# Patient Record
(demographics unavailable — no encounter records)

---

## 2025-01-27 NOTE — HISTORY OF PRESENT ILLNESS
[FreeTextEntry6] : croup like cough since last night no fevers has runny nose no difficulty breathing

## 2025-01-27 NOTE — DISCUSSION/SUMMARY
[FreeTextEntry1] : viral crop parent would like viral testing dexamethasone given in office 12 mg supportive care and warning signs discussed

## 2025-03-24 NOTE — DISCUSSION/SUMMARY
[FreeTextEntry1] :  In no acute distress. Appears adequately hydrated. Rapid strep negative, culture pending.   Recommend using mist from a humidifier. Allow the child to breathe cool air during the night by opening a window or door. Fever can be treated with an over-the-counter medication such as acetaminophen or ibuprofen. Coughing can be treated with warm, clear fluids to loosen mucus on the vocal cords. Warm water, apple juice, or lemonade is safe for children older than four months. Frozen juice popsicles also can be given. Keep the child's head elevated. If the child's stridor does not improve contact health care provider immediately.  Discussed indication for use of oral steroid.    RED FLAGS REVIEWED- discussed s/s of respiratory distress/ dehydration, discussed indications for going to ED for eval.  Parent expressed understanding and was able to verbalize back instructions/advice.  Parent to call/ return to office with patient for any concerns/ worsening symptoms.

## 2025-03-24 NOTE — HISTORY OF PRESENT ILLNESS
[FreeTextEntry6] : Patient's chief complaint is fever x2 days.  Presents with c/o cough/congestion x 2 days. +headache yesterday has since resolved +stomach pain  Mother states that cough sounds like a barking cough. Cough is worse at night.   Fever: TMAX 101.4   Meds given: Tylenol last dose at 630AM, Zyrtec  Appetite/activity at baseline, drinking well, good UO. No vomiting/No diarrhea. + school/sick contacts.

## 2025-06-20 NOTE — DISCUSSION/SUMMARY
[FreeTextEntry1] :  8 year M with molluscum contagiosum Potassium Hydroxide 10 % Solution to area twice a day  Anticipatory guidance and parent education given regarding Molluscum.  Wash hands well and often with soap and water. Cover the bumps with clothing or a bandage. Avoid sharing personal items, such as clothing, towels, hairbrushes, and bars of soap, since they can all harbor the virus and spread it to others. Leave the bumps alone, no scratching. Use caution when participating in sports. Keep area clean and dry.  If no improvement will consider referral to Peds Dermatology Father verbalized understanding   All questions and concerns addressed.  Call if symptoms change or worsen.

## 2025-06-20 NOTE — PHYSICAL EXAM
[NL] : moves all extremities x4, warm, well perfused x4 [Central Umbilicated] : central umbilicated [Papules] : papules [de-identified] : crop of six flesh-colored lesions on mid abdomen

## 2025-06-20 NOTE — HISTORY OF PRESENT ILLNESS
[de-identified] : small round rash on abdomen [FreeTextEntry6] : Small wart like areas on abdomen, spreading for the last month. Very itchy No meds used. Has been going in pools